# Patient Record
Sex: MALE | Race: BLACK OR AFRICAN AMERICAN | NOT HISPANIC OR LATINO | URBAN - METROPOLITAN AREA
[De-identification: names, ages, dates, MRNs, and addresses within clinical notes are randomized per-mention and may not be internally consistent; named-entity substitution may affect disease eponyms.]

---

## 2018-04-02 ENCOUNTER — INPATIENT (INPATIENT)
Facility: HOSPITAL | Age: 23
LOS: 3 days | Discharge: HOME | End: 2018-04-06
Attending: INTERNAL MEDICINE | Admitting: INTERNAL MEDICINE
Payer: COMMERCIAL

## 2018-04-02 VITALS
OXYGEN SATURATION: 99 % | TEMPERATURE: 100 F | SYSTOLIC BLOOD PRESSURE: 123 MMHG | HEART RATE: 126 BPM | RESPIRATION RATE: 26 BRPM | DIASTOLIC BLOOD PRESSURE: 75 MMHG

## 2018-04-02 DIAGNOSIS — J18.9 PNEUMONIA, UNSPECIFIED ORGANISM: ICD-10-CM

## 2018-04-02 LAB
ALBUMIN SERPL ELPH-MCNC: 3.6 G/DL — SIGNIFICANT CHANGE UP (ref 3.3–5)
ALP SERPL-CCNC: 125 U/L — HIGH (ref 40–120)
ALT FLD-CCNC: 21 U/L — SIGNIFICANT CHANGE UP (ref 12–78)
ANION GAP SERPL CALC-SCNC: 11 MMOL/L — SIGNIFICANT CHANGE UP (ref 5–17)
APPEARANCE UR: CLEAR — SIGNIFICANT CHANGE UP
APTT BLD: 28 SEC — SIGNIFICANT CHANGE UP (ref 27.5–37.4)
AST SERPL-CCNC: 19 U/L — SIGNIFICANT CHANGE UP (ref 15–37)
BACTERIA # UR AUTO: ABNORMAL
BASOPHILS # BLD AUTO: 0 K/UL — SIGNIFICANT CHANGE UP (ref 0–0.2)
BASOPHILS NFR BLD AUTO: 0 % — SIGNIFICANT CHANGE UP (ref 0–2)
BILIRUB SERPL-MCNC: 2.8 MG/DL — HIGH (ref 0.2–1.2)
BILIRUB UR-MCNC: NEGATIVE — SIGNIFICANT CHANGE UP
BUN SERPL-MCNC: 11 MG/DL — SIGNIFICANT CHANGE UP (ref 7–23)
CALCIUM SERPL-MCNC: 9 MG/DL — SIGNIFICANT CHANGE UP (ref 8.5–10.1)
CHLORIDE SERPL-SCNC: 98 MMOL/L — SIGNIFICANT CHANGE UP (ref 96–108)
CK MB BLD-MCNC: <0.4 % — SIGNIFICANT CHANGE UP (ref 0–3.5)
CK MB CFR SERPL CALC: <0.5 NG/ML — SIGNIFICANT CHANGE UP (ref 0.5–3.6)
CK SERPL-CCNC: 116 U/L — SIGNIFICANT CHANGE UP (ref 26–308)
CO2 SERPL-SCNC: 25 MMOL/L — SIGNIFICANT CHANGE UP (ref 22–31)
COLOR SPEC: YELLOW — SIGNIFICANT CHANGE UP
CREAT SERPL-MCNC: 1.23 MG/DL — SIGNIFICANT CHANGE UP (ref 0.5–1.3)
D DIMER BLD IA.RAPID-MCNC: 376 NG/ML DDU — HIGH
DIFF PNL FLD: NEGATIVE — SIGNIFICANT CHANGE UP
EOSINOPHIL # BLD AUTO: 0 K/UL — SIGNIFICANT CHANGE UP (ref 0–0.5)
EOSINOPHIL NFR BLD AUTO: 0 % — SIGNIFICANT CHANGE UP (ref 0–6)
EPI CELLS # UR: SIGNIFICANT CHANGE UP
FLUAV SPEC QL CULT: NEGATIVE — SIGNIFICANT CHANGE UP
FLUBV AG SPEC QL IA: NEGATIVE — SIGNIFICANT CHANGE UP
GLUCOSE SERPL-MCNC: 107 MG/DL — HIGH (ref 70–99)
GLUCOSE UR QL: NEGATIVE MG/DL — SIGNIFICANT CHANGE UP
GRAN CASTS # UR COMP ASSIST: ABNORMAL /LPF
HCT VFR BLD CALC: 41.6 % — SIGNIFICANT CHANGE UP (ref 39–50)
HGB BLD-MCNC: 14.1 G/DL — SIGNIFICANT CHANGE UP (ref 13–17)
INR BLD: 1.33 RATIO — HIGH (ref 0.88–1.16)
KETONES UR-MCNC: NEGATIVE — SIGNIFICANT CHANGE UP
LACTATE SERPL-SCNC: 1.1 MMOL/L — SIGNIFICANT CHANGE UP (ref 0.7–2)
LEUKOCYTE ESTERASE UR-ACNC: NEGATIVE — SIGNIFICANT CHANGE UP
LYMPHOCYTES # BLD AUTO: 0.78 K/UL — LOW (ref 1–3.3)
LYMPHOCYTES # BLD AUTO: 3 % — LOW (ref 13–44)
MANUAL SMEAR VERIFICATION: SIGNIFICANT CHANGE UP
MCHC RBC-ENTMCNC: 28.7 PG — SIGNIFICANT CHANGE UP (ref 27–34)
MCHC RBC-ENTMCNC: 33.9 GM/DL — SIGNIFICANT CHANGE UP (ref 32–36)
MCV RBC AUTO: 84.6 FL — SIGNIFICANT CHANGE UP (ref 80–100)
MONOCYTES # BLD AUTO: 1.82 K/UL — HIGH (ref 0–0.9)
MONOCYTES NFR BLD AUTO: 7 % — SIGNIFICANT CHANGE UP (ref 2–14)
NEUTROPHILS # BLD AUTO: 23.42 K/UL — HIGH (ref 1.8–7.4)
NEUTROPHILS NFR BLD AUTO: 86 % — HIGH (ref 43–77)
NEUTS BAND # BLD: 4 % — SIGNIFICANT CHANGE UP (ref 0–8)
NITRITE UR-MCNC: NEGATIVE — SIGNIFICANT CHANGE UP
NRBC # BLD: 0 /100 — SIGNIFICANT CHANGE UP (ref 0–0)
NRBC # BLD: SIGNIFICANT CHANGE UP /100 WBCS (ref 0–0)
PH UR: 6.5 — SIGNIFICANT CHANGE UP (ref 5–8)
PLAT MORPH BLD: NORMAL — SIGNIFICANT CHANGE UP
PLATELET # BLD AUTO: 199 K/UL — SIGNIFICANT CHANGE UP (ref 150–400)
POTASSIUM SERPL-MCNC: 3.6 MMOL/L — SIGNIFICANT CHANGE UP (ref 3.5–5.3)
POTASSIUM SERPL-SCNC: 3.6 MMOL/L — SIGNIFICANT CHANGE UP (ref 3.5–5.3)
PROT SERPL-MCNC: 8.2 GM/DL — SIGNIFICANT CHANGE UP (ref 6–8.3)
PROT UR-MCNC: 30 MG/DL
PROTHROM AB SERPL-ACNC: 14.6 SEC — HIGH (ref 9.8–12.7)
RBC # BLD: 4.92 M/UL — SIGNIFICANT CHANGE UP (ref 4.2–5.8)
RBC # FLD: 11.4 % — SIGNIFICANT CHANGE UP (ref 10.3–14.5)
RBC BLD AUTO: NORMAL — SIGNIFICANT CHANGE UP
SODIUM SERPL-SCNC: 134 MMOL/L — LOW (ref 135–145)
SP GR SPEC: 1 — LOW (ref 1.01–1.02)
TROPONIN I SERPL-MCNC: <.015 NG/ML — SIGNIFICANT CHANGE UP (ref 0.01–0.04)
UROBILINOGEN FLD QL: NEGATIVE MG/DL — SIGNIFICANT CHANGE UP
WBC # BLD: 26.02 K/UL — HIGH (ref 3.8–10.5)
WBC # FLD AUTO: 26.02 K/UL — HIGH (ref 3.8–10.5)
WBC UR QL: SIGNIFICANT CHANGE UP

## 2018-04-02 PROCEDURE — 99285 EMERGENCY DEPT VISIT HI MDM: CPT

## 2018-04-02 PROCEDURE — 71275 CT ANGIOGRAPHY CHEST: CPT | Mod: 26

## 2018-04-02 PROCEDURE — 93010 ELECTROCARDIOGRAM REPORT: CPT

## 2018-04-02 PROCEDURE — 71045 X-RAY EXAM CHEST 1 VIEW: CPT | Mod: 26

## 2018-04-02 RX ORDER — VANCOMYCIN HCL 1 G
1000 VIAL (EA) INTRAVENOUS ONCE
Qty: 0 | Refills: 0 | Status: COMPLETED | OUTPATIENT
Start: 2018-04-02 | End: 2018-04-02

## 2018-04-02 RX ORDER — SODIUM CHLORIDE 9 MG/ML
2000 INJECTION INTRAMUSCULAR; INTRAVENOUS; SUBCUTANEOUS ONCE
Qty: 0 | Refills: 0 | Status: COMPLETED | OUTPATIENT
Start: 2018-04-02 | End: 2018-04-02

## 2018-04-02 RX ORDER — KETOROLAC TROMETHAMINE 30 MG/ML
30 SYRINGE (ML) INJECTION ONCE
Qty: 0 | Refills: 0 | Status: DISCONTINUED | OUTPATIENT
Start: 2018-04-02 | End: 2018-04-02

## 2018-04-02 RX ORDER — ACETAMINOPHEN 500 MG
650 TABLET ORAL EVERY 6 HOURS
Qty: 0 | Refills: 0 | Status: DISCONTINUED | OUTPATIENT
Start: 2018-04-02 | End: 2018-04-06

## 2018-04-02 RX ORDER — ACETAMINOPHEN 500 MG
650 TABLET ORAL ONCE
Qty: 0 | Refills: 0 | Status: DISCONTINUED | OUTPATIENT
Start: 2018-04-02 | End: 2018-04-02

## 2018-04-02 RX ORDER — AZITHROMYCIN 500 MG/1
500 TABLET, FILM COATED ORAL ONCE
Qty: 0 | Refills: 0 | Status: DISCONTINUED | OUTPATIENT
Start: 2018-04-02 | End: 2018-04-02

## 2018-04-02 RX ORDER — ALBUTEROL 90 UG/1
0 AEROSOL, METERED ORAL
Qty: 0 | Refills: 0 | COMMUNITY

## 2018-04-02 RX ADMIN — Medication 250 MILLIGRAM(S): at 16:13

## 2018-04-02 RX ADMIN — Medication 30 MILLIGRAM(S): at 13:08

## 2018-04-02 RX ADMIN — Medication 30 MILLIGRAM(S): at 13:40

## 2018-04-02 RX ADMIN — Medication 75 MILLIGRAM(S): at 16:12

## 2018-04-02 RX ADMIN — SODIUM CHLORIDE 2000 MILLILITER(S): 9 INJECTION INTRAMUSCULAR; INTRAVENOUS; SUBCUTANEOUS at 13:08

## 2018-04-02 NOTE — ED ADULT NURSE NOTE - OBJECTIVE STATEMENT
assumed care of pt in room A. pt alert and oriented x3. pt c/o of chest pain 6/10,  productive cough with yellow flem, fever, wheezing. Pmh asthma and pt states he ran out of his nebulizer. pt denies radiation of pain. Hr at triage 126, pt on cardiac monitor HR now 86, spo2 96%, fever 102.

## 2018-04-02 NOTE — H&P ADULT - NSHPLABSRESULTS_GEN_ALL_CORE
14.1   26.02 )-----------( 199      ( 02 Apr 2018 13:18 )             41.6   04-02    134<L>  |  98  |  11  ----------------------------<  107<H>  3.6   |  25  |  1.23    Ca    9.0      02 Apr 2018 13:19    TPro  8.2  /  Alb  3.6  /  TBili  2.8<H>  /  DBili  x   /  AST  19  /  ALT  21  /  AlkPhos  125<H>  04-02  < from: CT Angio Chest w/ IV Cont (04.02.18 @ 14:43) >    Multifocal pneumonia.    < from: Xray Chest 1 View-PORTABLE IMMEDIATE (04.02.18 @ 13:05) >    Left lower lobe infiltrate.    < end of copied text >

## 2018-04-02 NOTE — H&P ADULT - HISTORY OF PRESENT ILLNESS
21 yo male c/o chest pain, cough, yellow sputum production for one week. Patient denies headache, rash, sore throat, nasuea, vomiting, abdominal pain. weakness, diarrhea. 21 yo male c/o chest pain, cough, yellow sputum production for one week. Patient denies headache, rash, sore throat, nasuea, vomiting, abdominal pain. weakness, diarrhea.- chest pain is pleuritic with mild short of breath - denies hemoptysis foriegn travel

## 2018-04-02 NOTE — H&P ADULT - ASSESSMENT
22m with multifocal pneumonia     IMPROVE VTE Individual Risk Assessment          RISK                                                          Points  [  ] Previous VTE                                                3  [  ] Thrombophilia                                             2  [  ] Lower limb paralysis                                   2        (unable to hold up >15 seconds)    [  ] Current Cancer                                             2         (within 6 months)  [  ] Immobilization > 24 hrs                              1  [  ] ICU/CCU stay > 24 hours                             1  [  ] Age > 60                                                         1    IMPROVE VTE Score: 0

## 2018-04-02 NOTE — ED ADULT TRIAGE NOTE - CHIEF COMPLAINT QUOTE
pt complaining of cough, chest pain and wheezing times 3 days. seen at urgent care sent in for elevated HR.  at triage.

## 2018-04-02 NOTE — ED PROVIDER NOTE - OBJECTIVE STATEMENT
21 yo male c/o chest pain, cough, yellow sputum production for one week. Patient denies headache, rash, sore throat, nasuea, vomiting, abdominal pain. weakness, diarrhea.

## 2018-04-02 NOTE — ED PROVIDER NOTE - MEDICAL DECISION MAKING DETAILS
Patient with cough, found to have pneumonia; will provide levaquin 750mg daily for 5 days, symptomatic care. return instructions provided Patient with cough, found to have pneumonia; multifocal on ct, will admit

## 2018-04-02 NOTE — H&P ADULT - NSHPPHYSICALEXAM_GEN_ALL_CORE
GENERAL: NAD well-developed  HEAD:  Atraumatic, Normocephalic  EYES: EOMI, PERRLA, conjunctiva and sclera clear  ENMT: No tonsillar erythema, exudates, or enlargement; Moist mucous membranes, Good dentition, No lesions  NECK: Supple, No JVD, Normal thyroid  NERVOUS SYSTEM:  Alert & Oriented X3, Good concentration; Motor Strength 5/5 B/L upper and lower extremities; DTRs 2+ intact and symmetric  CHEST/LUNG: Clear to percussion bilaterally; No rales, rhonchi, wheezing, or rubs  HEART: Regular rate and rhythm; No murmurs, rubs, or gallops  ABDOMEN: Soft, Nontender, Nondistended; Bowel sounds present  EXTREMITIES:  2+ Peripheral Pulses, No clubbing, cyanosis, or edema  LYMPH: No lymphadenopathy   SKIN: No rashes or lesions GENERAL: NAD well-developed  HEAD:  Atraumatic, Normocephalic  EYES: EOMI, PERRLA, conjunctiva and sclera clear  ENMT: No tonsillar erythema, exudates, or enlargement; Moist mucous membranes, Good dentition, No lesions  NECK: Supple, No JVD, Normal thyroid  NERVOUS SYSTEM:  Alert & Oriented X3, Good concentration; Motor Strength 5/5 B/L upper and lower extremities; DTRs 2+ intact and symmetric  CHEST/LUNG: scattered end expiratory squeeks   HEART: Regular rate and rhythm; No murmurs, rubs, or gallops  ABDOMEN: Soft, Nontender, Nondistended; Bowel sounds present  EXTREMITIES:  2+ Peripheral Pulses, No clubbing, cyanosis, or edema  LYMPH: No lymphadenopathy   SKIN: No rashes or lesions

## 2018-04-02 NOTE — H&P ADULT - NSHPREVIEWOFSYSTEMS_GEN_ALL_CORE

## 2018-04-03 DIAGNOSIS — A40.3 SEPSIS DUE TO STREPTOCOCCUS PNEUMONIAE: ICD-10-CM

## 2018-04-03 DIAGNOSIS — J15.6 PNEUMONIA DUE TO OTHER GRAM-NEGATIVE BACTERIA: ICD-10-CM

## 2018-04-03 LAB
CULTURE RESULTS: NO GROWTH — SIGNIFICANT CHANGE UP
GRAM STN FLD: SIGNIFICANT CHANGE UP
GRAM STN FLD: SIGNIFICANT CHANGE UP
METHOD TYPE: SIGNIFICANT CHANGE UP
S PNEUM DNA BLD POS QL NAA+NON-PROBE: SIGNIFICANT CHANGE UP
SPECIMEN SOURCE: SIGNIFICANT CHANGE UP

## 2018-04-03 PROCEDURE — 99223 1ST HOSP IP/OBS HIGH 75: CPT

## 2018-04-03 PROCEDURE — 99233 SBSQ HOSP IP/OBS HIGH 50: CPT

## 2018-04-03 RX ORDER — CEFTRIAXONE 500 MG/1
INJECTION, POWDER, FOR SOLUTION INTRAMUSCULAR; INTRAVENOUS
Qty: 0 | Refills: 0 | Status: DISCONTINUED | OUTPATIENT
Start: 2018-04-03 | End: 2018-04-06

## 2018-04-03 RX ORDER — CEFTRIAXONE 500 MG/1
2 INJECTION, POWDER, FOR SOLUTION INTRAMUSCULAR; INTRAVENOUS EVERY 24 HOURS
Qty: 0 | Refills: 0 | Status: DISCONTINUED | OUTPATIENT
Start: 2018-04-04 | End: 2018-04-06

## 2018-04-03 RX ORDER — CEFTRIAXONE 500 MG/1
2 INJECTION, POWDER, FOR SOLUTION INTRAMUSCULAR; INTRAVENOUS ONCE
Qty: 0 | Refills: 0 | Status: COMPLETED | OUTPATIENT
Start: 2018-04-03 | End: 2018-04-03

## 2018-04-03 RX ADMIN — CEFTRIAXONE 100 GRAM(S): 500 INJECTION, POWDER, FOR SOLUTION INTRAMUSCULAR; INTRAVENOUS at 22:17

## 2018-04-03 NOTE — PROVIDER CONTACT NOTE (CRITICAL VALUE NOTIFICATION) - TEST AND RESULT REPORTED:
blood culture results from 4/2/18 growth in aerobic and anaerobic bottles gram (+) cocci in pairs and chains

## 2018-04-03 NOTE — CONSULT NOTE ADULT - ASSESSMENT
24 yr old with pneumonia due to strept pneumo with BSI   cahge to [facility]   check HIV (pt consented )   repeat blood c/s in am 24 yr old with pneumonia due to strept pneumo with BSI with sepsis at presentation    change levaquin  to Rocephin   check HIV (pt consented )   repeat blood c/s in am

## 2018-04-03 NOTE — PROGRESS NOTE ADULT - SUBJECTIVE AND OBJECTIVE BOX
Patient is a 22y old  Male who presents with a chief complaint of     OVERNIGHT EVENTS:      REVIEW OF SYSTEMS: denies chest pain/SOB, diaphoresis, no F/C, cough, dizziness, headache, blurry vision, nausea, vomiting, abdominal pain. Rest unremarkable     MEDICATIONS  (STANDING):  levoFLOXacin IVPB      levoFLOXacin IVPB 750 milliGRAM(s) IV Intermittent every 24 hours    MEDICATIONS  (PRN):  acetaminophen   Tablet 650 milliGRAM(s) Oral every 6 hours PRN For Temp greater than 38 C (100.4 F)      Allergies    No Known Drug Allergies  Nuts (Swelling)  peanuts (Swelling)    Intolerances        SUBJECTIVE: in bed in NAD, no acute events overnight     T(F): 99 (18 @ 10:32), Max: 99.6 (18 @ 00:16)  HR: 79 (18 @ 10:32) (79 - 92)  BP: 113/73 (18 @ 10:32) (113/73 - 131/108)  RR: 16 (18 @ 10:32) (16 - 19)  SpO2: 97% (18 @ 10:32) (94% - 98%)  Wt(kg): --    PHYSICAL EXAM:  GENERAL: NAD, well-groomed, well-developed  HEAD:  Atraumatic, Normocephalic  EYES: EOMI, PERRLA, conjunctiva and sclera clear  ENMT: No tonsillar erythema, exudates, or enlargement; Moist mucous membranes, Good dentition, No lesions  NECK: Supple, No JVD, Normal thyroid  CHEST/LUNG: Clear to  auscultation bilaterally; No rales, rhonchi, wheezing, or rubs  bilaterally  HEART: Regular rate and rhythm; No murmurs, rubs, or gallops  ABDOMEN: Soft, Nontender, Nondistended; Bowel sounds present  EXTREMITIES:  2+ Peripheral Pulses, No clubbing, cyanosis, or edema BL LE  LYMPH: No lymphadenopathy noted  SKIN: No rashes or lesions  NERVOUS SYSTEM:  Alert & Oriented X3, Good concentration; Motor Strength 5/5 B/L upper and lower extremities;   DTRs 2+ intact and symmetric, sensation intact BL    LABS:                        14.1   26.02 )-----------( 199      ( 2018 13:18 )             41.6     04-02    134<L>  |  98  |  11  ----------------------------<  107<H>  3.6   |  25  |  1.23    Ca    9.0      2018 13:19    TPro  8.2  /  Alb  3.6  /  TBili  2.8<H>  /  DBili  x   /  AST  19  /  ALT  21  /  AlkPhos  125<H>      PT/INR - ( 2018 13:19 )   PT: 14.6 sec;   INR: 1.33 ratio         PTT - ( 2018 13:19 )  PTT:28.0 sec  Urinalysis Basic - ( 2018 16:29 )    Color: Yellow / Appearance: Clear / S.005 / pH: x  Gluc: x / Ketone: Negative  / Bili: Negative / Urobili: Negative mg/dL   Blood: x / Protein: 30 mg/dL / Nitrite: Negative   Leuk Esterase: Negative / RBC: x / WBC 0-2   Sq Epi: x / Non Sq Epi: Occasional / Bacteria: Few      Cultures;   CAPILLARY BLOOD GLUCOSE        Lipid panel:     CARDIAC MARKERS ( 2018 13:19 )  <.015 ng/mL / x     / 116 U/L / x     / <0.5 ng/mL        RADIOLOGY & ADDITIONAL TESTS:      Imaging Personally Reviewed:  [ x] YES      Consultant(s) Notes Reviewed:  [x ] YES     Care Discussed with [x ] Consultants [X ] Patient [x ] Family  [x ]    [x ]  Other; RN Patient is a 22y old  Male who presents with a chief complaint of     OVERNIGHT EVENTS: none  states feels better      REVIEW OF SYSTEMS: denies chest pain/SOB, diaphoresis, no F/C, cough, dizziness, headache, blurry vision, nausea, vomiting, abdominal pain. Rest unremarkable     MEDICATIONS  (STANDING):  levoFLOXacin IVPB      levoFLOXacin IVPB 750 milliGRAM(s) IV Intermittent every 24 hours    MEDICATIONS  (PRN):  acetaminophen   Tablet 650 milliGRAM(s) Oral every 6 hours PRN For Temp greater than 38 C (100.4 F)      Allergies    No Known Drug Allergies  Nuts (Swelling)  peanuts (Swelling)    Intolerances        SUBJECTIVE: in bed in NAD, no acute events overnight     T(F): 99 (18 @ 10:32), Max: 99.6 (18 @ 00:16)  HR: 79 (18 @ 10:32) (79 - 92)  BP: 113/73 (18 @ 10:32) (113/73 - 131/108)  RR: 16 (18 @ 10:32) (16 - 19)  SpO2: 97% (18 @ 10:32) (94% - 98%)  Wt(kg): --    PHYSICAL EXAM:  GENERAL: NAD, well-groomed, well-developed  HEAD:  Atraumatic, Normocephalic  EYES: EOMI, PERRLA, conjunctiva and sclera clear  ENMT: No tonsillar erythema, exudates, or enlargement; Moist mucous membranes, Good dentition, No lesions  NECK: Supple, No JVD, Normal thyroid  CHEST/LUNG:  mild coarse bs scattered. Clear to  auscultation bilaterally; No rales, rhonchi, wheezing, or rubs  bilaterally  HEART: Regular rate and rhythm; No murmurs, rubs, or gallops  ABDOMEN: Soft, Nontender, Nondistended; Bowel sounds present  EXTREMITIES:  2+ Peripheral Pulses, No clubbing, cyanosis, or edema BL LE    SKIN: No rashes or lesions, covered in tattoos  NERVOUS SYSTEM:  Alert & Oriented X3, Good concentration; Motor Strength 5/5 B/L upper and lower extremities;   DTRs 2+ intact and symmetric, sensation intact BL    LABS:                        14.1   26.02 )-----------( 199      ( 2018 13:18 )             41.6         134<L>  |  98  |  11  ----------------------------<  107<H>  3.6   |  25  |  1.23    Ca    9.0      2018 13:19    TPro  8.2  /  Alb  3.6  /  TBili  2.8<H>  /  DBili  x   /  AST  19  /  ALT  21  /  AlkPhos  125<H>      PT/INR - ( 2018 13:19 )   PT: 14.6 sec;   INR: 1.33 ratio         PTT - ( 2018 13:19 )  PTT:28.0 sec  Urinalysis Basic - ( 2018 16:29 )    Color: Yellow / Appearance: Clear / S.005 / pH: x  Gluc: x / Ketone: Negative  / Bili: Negative / Urobili: Negative mg/dL   Blood: x / Protein: 30 mg/dL / Nitrite: Negative   Leuk Esterase: Negative / RBC: x / WBC 0-2   Sq Epi: x / Non Sq Epi: Occasional / Bacteria: Few      Cultures;   CAPILLARY BLOOD GLUCOSE        Lipid panel:     CARDIAC MARKERS ( 2018 13:19 )  <.015 ng/mL / x     / 116 U/L / x     / <0.5 ng/mL        RADIOLOGY & ADDITIONAL TESTS:      Imaging Personally Reviewed:  [ x] YES      Consultant(s) Notes Reviewed:  [x ] YES     Care Discussed with [x ] Consultants [X ] Patient [x ] Family  [x ]    [x ]  Other; RN

## 2018-04-03 NOTE — CONSULT NOTE ADULT - SUBJECTIVE AND OBJECTIVE BOX
Infectious Diseases - Attending at Dr. [facility]    HPI:  23 yo male c/o chest pain, cough, yellow sputum production for one week. Patient denies headache, rash, sore throat, nasuea, vomiting, abdominal pain. weakness, diarrhea.- chest pain is pleuritic with mild short of breath - denies hemoptysis foriegn travel (2018 16:04)      PAST MEDICAL & SURGICAL HISTORY:  Asthma  No pertinent past medical history  No significant past surgical history      Allergies    No Known Drug Allergies  Nuts (Swelling)  peanuts (Swelling)    Intolerances        FAMILY HISTORY:      SOCIAL HISTORY:    REVIEW OF SYSTEMS:    Constitutional: No fever, weight loss or fatigue  Eyes: No eye pain, visual disturbances, or discharge  ENT:  No difficulty hearing, tinnitus, vertigo; No sinus or throat pain  Neck: No pain or stiffness  Respiratory: No cough, wheezing, chills or hemoptysis  Cardiovascular: No chest pain, palpitations, shortness of breath, dizziness or leg swelling  Gastrointestinal: No abdominal or epigastric pain. No nausea, vomiting or hematemesis; No diarrhea or constipation. No melena or hematochezia.  Genitourinary: No dysuria, frequency, hematuria or incontinence  Neurological: No headaches, memory loss, loss of strength, numbness or tremors  Skin: No itching, burning, rashes or lesions       MEDICATIONS  (STANDING):  cefTRIAXone   IVPB        MEDICATIONS  (PRN):  acetaminophen   Tablet 650 milliGRAM(s) Oral every 6 hours PRN For Temp greater than 38 C (100.4 F)  guaiFENesin    Syrup 200 milliGRAM(s) Oral every 6 hours PRN Cough      Vital Signs Last 24 Hrs  T(C): 36.6 (2018 17:49), Max: 37.6 (2018 00:16)  T(F): 97.9 (2018 17:49), Max: 99.6 (2018 00:16)  HR: 66 (2018 17:49) (66 - 85)  BP: 124/70 (2018 17:49) (113/73 - 124/77)  BP(mean): --  RR: 15 (2018 17:49) (15 - 16)  SpO2: 96% (2018 17:49) (94% - 97%)    PHYSICAL EXAM:    Constitutional: NAD, well-groomed, well-developed  HEENT: PERRLA, EOMI, Normal Hearing, MMM  Neck: No LAD, No JVD  Back: Normal spine flexure, No CVA tenderness  Respiratory: CTAB/L  Cardiovascular: S1 and S2, RRR, no M/G/R  Gastrointestinal: BS+, soft, NT/ND  Extremities: No peripheral edema  Vascular: 2+ peripheral pulses  Neurological: A/O x 3, no focal deficits  Skin: No rashes      LABS:                        14.1   26.02 )-----------( 199      ( 2018 13:18 )             41.6         134<L>  |  98  |  11  ----------------------------<  107<H>  3.6   |  25  |  1.23    Ca    9.0      2018 13:19    TPro  8.2  /  Alb  3.6  /  TBili  2.8<H>  /  DBili  x   /  AST  19  /  ALT  21  /  AlkPhos  125<H>      PT/INR - ( 2018 13:19 )   PT: 14.6 sec;   INR: 1.33 ratio         PTT - ( 2018 13:19 )  PTT:28.0 sec  Urinalysis Basic - ( 2018 16:29 )    Color: Yellow / Appearance: Clear / S.005 / pH: x  Gluc: x / Ketone: Negative  / Bili: Negative / Urobili: Negative mg/dL   Blood: x / Protein: 30 mg/dL / Nitrite: Negative   Leuk Esterase: Negative / RBC: x / WBC 0-2   Sq Epi: x / Non Sq Epi: Occasional / Bacteria: Few        MICROBIOLOGY:  RECENT CULTURES:   .Urine Clean Catch (Midstream) XXXX XXXX   No growth     .Blood Blood-Venous XXXX   Growth in aerobic bottle: Gram Positive Cocci in Pairs and Chains  Growth in anaerobic bottle: Gram positive cocci in pairs   Growth in aerobic bottle: Gram Positive Cocci in Pairs and Chains  Growth in anaerobic bottle: Gram positive cocci in pairs     .Blood Blood-Venous Blood Culture PCR   Growth in aerobic and anaerobic bottles: Gram Positive Cocci in Pairs and  Chains   Growth in aerobic and anaerobic bottles: Gram Positive Cocci in Pairs and  Chains  "Due to technical problems, Proteus sp. will Not be reported as part of  the BCID panel until further notice"  ***Blood Panel PCR results on this specimen are available  approximately 3 hours after the Gram stain result.***  Gram stain, PCR, and/or culture results may not always  correspond due to difference in methodologies.  ************************************************************  This PCR assay was performed using [employer].  The following targets are tested for: Enterococcus,  vancomycin resistant enterococci, Listeria monocytogenes,  coagulase negative staphylococci, S. aureus,  methicillin resistant S. aureus, Streptococcus agalactiae  (Group B), S. pneumoniae, S. pyogenes (Group A),  Acinetobacter baumannii, Enterobacter cloacae, E. coli,  Klebsiella oxytoca, K. pneumoniae, Proteus sp.,  Serratia marcescens, Haemophilus influenzae,  Neisseria meningitidis, Pseudomonas aeruginosa, Candida  albicans, C. glabrata, C krusei, C parapsilosis,  C. tropicalis and the KPC resistance gene.          RADIOLOGY & ADDITIONAL STUDIES: Infectious Diseases - Attending at Dr[facility]    HPI:  23 yo male c/o chest pain, cough, yellow sputum production for one week. Patient denies headache, rash, sore throat, nasuea, vomiting, abdominal pain. weakness, diarrhea.- chest pain is pleuritic with mild short of breath - denies hemoptysis foriegn travel (2018 16:04)      PAST MEDICAL & SURGICAL HISTORY:  Asthma  No pertinent past medical history  No significant past surgical history      Allergies    No Known Drug Allergies  Nuts (Swelling)  peanuts (Swelling)    Intolerances        FAMILY HISTORY:non contributory       SOCIAL HISTORY:non smoker    REVIEW OF SYSTEMS:    Constitutional: +chills or fatigue  Eyes: No eye pain, visual disturbances, or discharge  ENT:  No difficulty hearing, tinnitus, vertigo; No sinus or throat pain  Neck: No pain or stiffness  Respiratory: + cough with yellow phlegm, wheezing, No hemoptysis  Cardiovascular: + pleuritic chest pain, no palpitations, shortness of breath, dizziness or leg swelling  Gastrointestinal: No abdominal or epigastric pain. No nausea, vomiting or hematemesis; No diarrhea or constipation. No melena or hematochezia.  Genitourinary: No dysuria, frequency, hematuria or incontinence  Neurological: No headaches, memory loss, loss of strength, numbness or tremors  Skin: No itching, burning, rashes or lesions       MEDICATIONS  (STANDING):  cefTRIAXone   IVPB        MEDICATIONS  (PRN):  acetaminophen   Tablet 650 milliGRAM(s) Oral every 6 hours PRN For Temp greater than 38 C (100.4 F)  guaiFENesin    Syrup 200 milliGRAM(s) Oral every 6 hours PRN Cough      Vital Signs Last 24 Hrs  T(C): 36.6 (2018 17:49), Max: 37.6 (2018 00:16)  T(F): 97.9 (2018 17:49), Max: 99.6 (2018 00:16)  HR: 66 (2018 17:49) (66 - 85)  BP: 124/70 (2018 17:49) (113/73 - 124/77)  BP(mean): --  RR: 15 (2018 17:49) (15 - 16)  SpO2: 96% (2018 17:49) (94% - 97%)    PHYSICAL EXAM:    Constitutional: NAD, well-groomed, well-developed  HEENT: PERRLA, EOMI, Normal Hearing, MMM  Neck: No LAD, No JVD  Back: Normal spine flexure, No CVA tenderness  Respiratory: CTAB/L  Cardiovascular: S1 and S2, RRR, no M/G/R  Gastrointestinal: BS+, soft, NT/ND  Extremities: No peripheral edema  Vascular: 2+ peripheral pulses  Neurological: A/O x 3, no focal deficits  Skin: No rashes      LABS:                        14.1   26.02 )-----------( 199      ( 2018 13:18 )             41.6         134<L>  |  98  |  11  ----------------------------<  107<H>  3.6   |  25  |  1.23    Ca    9.0      2018 13:19    TPro  8.2  /  Alb  3.6  /  TBili  2.8<H>  /  DBili  x   /  AST  19  /  ALT  21  /  AlkPhos  125<H>      PT/INR - ( 2018 13:19 )   PT: 14.6 sec;   INR: 1.33 ratio         PTT - ( 2018 13:19 )  PTT:28.0 sec  Urinalysis Basic - ( 2018 16:29 )    Color: Yellow / Appearance: Clear / S.005 / pH: x  Gluc: x / Ketone: Negative  / Bili: Negative / Urobili: Negative mg/dL   Blood: x / Protein: 30 mg/dL / Nitrite: Negative   Leuk Esterase: Negative / RBC: x / WBC 0-2   Sq Epi: x / Non Sq Epi: Occasional / Bacteria: Few        MICROBIOLOGY:  RECENT CULTURES:   .Urine Clean Catch (Midstream) XXXX XXXX   No growth     .Blood Blood-Venous XXXX   Growth in aerobic bottle: Gram Positive Cocci in Pairs and Chains  Growth in anaerobic bottle: Gram positive cocci in pairs   Growth in aerobic bottle: Gram Positive Cocci in Pairs and Chains  Growth in anaerobic bottle: Gram positive cocci in pairs     .Blood Blood-Venous Blood Culture PCR   Growth in aerobic and anaerobic bottles: Gram Positive Cocci in Pairs and  Chains   Growth in aerobic and anaerobic bottles: Gram Positive Cocci in Pairs and  Chains  "Due to technical problems, Proteus sp. will Not be reported as part of  the BCID panel until further notice"  ***Blood Panel PCR results on this specimen are available  approximately 3 hours after the Gram stain result.***  Gram stain, PCR, and/or culture results may not always  correspond due to difference in methodologies.  ************************************************************  This PCR assay was performed using [employer].  The following targets are tested for: Enterococcus,  vancomycin resistant enterococci, Listeria monocytogenes,  coagulase negative staphylococci, S. aureus,  methicillin resistant S. aureus, Streptococcus agalactiae  (Group B), S. pneumoniae, S. pyogenes (Group A),  Acinetobacter baumannii, Enterobacter cloacae, E. coli,  Klebsiella oxytoca, K. pneumoniae, Proteus sp.,  Serratia marcescens, Haemophilus influenzae,  Neisseria meningitidis, Pseudomonas aeruginosa, Candida  albicans, C. glabrata, C krusei, C parapsilosis,  C. tropicalis and the KPC resistance gene.          RADIOLOGY & ADDITIONAL STUDIES:  < from: CT Angio Chest w/ IV Cont (18 @ 14:43) >    CHEST:     LUNGS AND LARGE AIRWAYS: Patent central airways. Patchy nodular and   groundglass airspace consolidative opacities within the bilateral upper   and lower lobes concerning for multifocal pneumonia.  PLEURA: No pleural effusion.  VESSELS: Adequate opacification of the pulmonary arteries, however   respiratory motion limits evaluation of numerous subsegmental pulmonary   arterial branches. No evidence of pulmonary embolus.  HEART: Heart size is normal. No pericardial effusion.  MEDIASTINUM AND KERWIN: No lymphadenopathy.  CHEST WALL AND LOWER NECK: Within normal limits.  VISUALIZED UPPER ABDOMEN: Partially imaged low-attenuation lesion within   the right hepatic lobe too small to accurately characterize.  BONES: Within normal limits.    IMPRESSION:    No evidence of pulmonary embolus.    Multifocal pneumonia.    < end of copied text >

## 2018-04-04 DIAGNOSIS — J13 PNEUMONIA DUE TO STREPTOCOCCUS PNEUMONIAE: ICD-10-CM

## 2018-04-04 LAB
ANION GAP SERPL CALC-SCNC: 8 MMOL/L — SIGNIFICANT CHANGE UP (ref 5–17)
BUN SERPL-MCNC: 8 MG/DL — SIGNIFICANT CHANGE UP (ref 7–23)
CALCIUM SERPL-MCNC: 8.9 MG/DL — SIGNIFICANT CHANGE UP (ref 8.5–10.1)
CHLORIDE SERPL-SCNC: 105 MMOL/L — SIGNIFICANT CHANGE UP (ref 96–108)
CO2 SERPL-SCNC: 28 MMOL/L — SIGNIFICANT CHANGE UP (ref 22–31)
CREAT SERPL-MCNC: 1.01 MG/DL — SIGNIFICANT CHANGE UP (ref 0.5–1.3)
GLUCOSE SERPL-MCNC: 90 MG/DL — SIGNIFICANT CHANGE UP (ref 70–99)
HCT VFR BLD CALC: 39.5 % — SIGNIFICANT CHANGE UP (ref 39–50)
HGB BLD-MCNC: 13 G/DL — SIGNIFICANT CHANGE UP (ref 13–17)
HIV 1+2 AB+HIV1 P24 AG SERPL QL IA: SIGNIFICANT CHANGE UP
MAGNESIUM SERPL-MCNC: 2.3 MG/DL — SIGNIFICANT CHANGE UP (ref 1.6–2.6)
MCHC RBC-ENTMCNC: 28.4 PG — SIGNIFICANT CHANGE UP (ref 27–34)
MCHC RBC-ENTMCNC: 32.9 GM/DL — SIGNIFICANT CHANGE UP (ref 32–36)
MCV RBC AUTO: 86.4 FL — SIGNIFICANT CHANGE UP (ref 80–100)
NRBC # BLD: 0 /100 WBCS — SIGNIFICANT CHANGE UP (ref 0–0)
PHOSPHATE SERPL-MCNC: 3.8 MG/DL — SIGNIFICANT CHANGE UP (ref 2.5–4.5)
PLATELET # BLD AUTO: 226 K/UL — SIGNIFICANT CHANGE UP (ref 150–400)
POTASSIUM SERPL-MCNC: 3.6 MMOL/L — SIGNIFICANT CHANGE UP (ref 3.5–5.3)
POTASSIUM SERPL-SCNC: 3.6 MMOL/L — SIGNIFICANT CHANGE UP (ref 3.5–5.3)
RBC # BLD: 4.57 M/UL — SIGNIFICANT CHANGE UP (ref 4.2–5.8)
RBC # FLD: 11.6 % — SIGNIFICANT CHANGE UP (ref 10.3–14.5)
SODIUM SERPL-SCNC: 141 MMOL/L — SIGNIFICANT CHANGE UP (ref 135–145)
WBC # BLD: 9.14 K/UL — SIGNIFICANT CHANGE UP (ref 3.8–10.5)
WBC # FLD AUTO: 9.14 K/UL — SIGNIFICANT CHANGE UP (ref 3.8–10.5)

## 2018-04-04 PROCEDURE — 99233 SBSQ HOSP IP/OBS HIGH 50: CPT

## 2018-04-04 PROCEDURE — 99232 SBSQ HOSP IP/OBS MODERATE 35: CPT

## 2018-04-04 RX ADMIN — CEFTRIAXONE 100 GRAM(S): 500 INJECTION, POWDER, FOR SOLUTION INTRAMUSCULAR; INTRAVENOUS at 21:35

## 2018-04-04 RX ADMIN — Medication 200 MILLIGRAM(S): at 13:18

## 2018-04-04 RX ADMIN — Medication 200 MILLIGRAM(S): at 19:21

## 2018-04-04 NOTE — PROGRESS NOTE ADULT - SUBJECTIVE AND OBJECTIVE BOX
Patient is a 22y old  Male who presents with a chief complaint of     OVERNIGHT EVENTS: none  states feels better      REVIEW OF SYSTEMS: denies chest pain/SOB, diaphoresis, no F/C, cough, dizziness, headache, blurry vision, nausea, vomiting, abdominal pain. Rest unremarkable     MEDICATIONS  (STANDING):  cefTRIAXone   IVPB      cefTRIAXone   IVPB 2 Gram(s) IV Intermittent every 24 hours    MEDICATIONS  (PRN):  acetaminophen   Tablet 650 milliGRAM(s) Oral every 6 hours PRN For Temp greater than 38 C (100.4 F)  guaiFENesin    Syrup 200 milliGRAM(s) Oral every 6 hours PRN Cough      Allergies    No Known Drug Allergies  Nuts (Swelling)  peanuts (Swelling)    Intolerances        SUBJECTIVE: in bed in NAD, no acute events overnight     Vital Signs Last 24 Hrs  T(C): 36.6 (04 Apr 2018 05:30), Max: 36.6 (03 Apr 2018 17:49)  T(F): 97.8 (04 Apr 2018 05:30), Max: 97.9 (03 Apr 2018 17:49)  HR: 95 (04 Apr 2018 05:30) (66 - 95)  BP: 110/72 (04 Apr 2018 05:30) (110/72 - 131/74)  BP(mean): --  RR: 16 (04 Apr 2018 05:30) (15 - 16)  SpO2: 97% (04 Apr 2018 05:30) (96% - 98%)    PHYSICAL EXAM:  GENERAL: NAD, well-groomed, well-developed  HEAD:  Atraumatic, Normocephalic  EYES: EOMI, PERRLA, conjunctiva and sclera clear  ENMT: No tonsillar erythema, exudates, or enlargement; Moist mucous membranes, Good dentition, No lesions  NECK: Supple, No JVD, Normal thyroid  CHEST/LUNG:  mild coarse bs scattered. Clear to  auscultation bilaterally; No rales, rhonchi, wheezing, or rubs  bilaterally  HEART: Regular rate and rhythm; No murmurs, rubs, or gallops  ABDOMEN: Soft, Nontender, Nondistended; Bowel sounds present  EXTREMITIES:  2+ Peripheral Pulses, No clubbing, cyanosis, or edema BL LE    SKIN: No rashes or lesions, covered in tattoos  NERVOUS SYSTEM:  Alert & Oriented X3, Good concentration; Motor Strength 5/5 B/L upper and lower extremities;   DTRs 2+ intact and symmetric, sensation intact BL    LABS:                                     13.0   9.14  )-----------( 226      ( 04 Apr 2018 06:20 )             39.5   04-04    141  |  105  |  8   ----------------------------<  90  3.6   |  28  |  1.01    Ca    8.9      04 Apr 2018 06:20  Phos  3.8     04-04  Mg     2.3     04-04      Culture - Urine (collected 02 Apr 2018 22:39)  Source: .Urine Clean Catch (Midstream)  Final Report (03 Apr 2018 19:30):    No growth    Culture - Blood (collected 02 Apr 2018 16:43)  Source: .Blood Blood-Venous  Gram Stain (prelim) (03 Apr 2018 10:03):    Growth in aerobic bottle: Gram Positive Cocci in Pairs and Chains    Growth in anaerobic bottle: Gram positive cocci in pairs  Preliminary Report (04 Apr 2018 09:03):    Growth in aerobic and anaerobic bottles: Streptococcus pneumoniae    Culture - Blood (collected 02 Apr 2018 16:40)  Source: .Blood Blood-Venous  Gram Stain (prelim) (03 Apr 2018 06:56):    Growth in aerobic and anaerobic bottles: Gram Positive Cocci in Pairs and    Chains  Preliminary Report (04 Apr 2018 09:01):    Growth in aerobic and anaerobic bottles: Streptococcus pneumoniae    "Due to technical problems, Proteus sp. will Not be reported as part of    the BCID panel until further notice"    ***Blood Panel PCR results on this specimen are available    approximately 3 hours after the Gram stain result.***    Gram stain, PCR, and/or culture results may not always    correspond due to difference in methodologies.    ************************************************************    This PCR assay was performed using [employer].    The following targets are tested for: Enterococcus,    vancomycin resistant enterococci, Listeria monocytogenes,    coagulase negative staphylococci, S. aureus,    methicillin resistant S. aureus, Streptococcus agalactiae    (Group B), S. pneumoniae, S. pyogenes (Group A),    Acinetobacter baumannii, Enterobacter cloacae, E. coli,    Klebsiella oxytoca, K. pneumoniae, Proteus sp.,    Serratia marcescens, Haemophilus influenzae,    Neisseria meningitidis, Pseudomonas aeruginosa, Candida    albicans, C.glabrata, C krusei, C parapsilosis,    C. tropicalis and the KPC resistance gene.  Organism: Blood Culture PCR (03 Apr 2018 09:05)  Organism: Blood Culture PCR (03 Apr 2018 09:05)    Cultures;   CAPILLARY BLOOD GLUCOSE        Lipid panel:     CARDIAC MARKERS ( 02 Apr 2018 13:19 )  <.015 ng/mL / x     / 116 U/L / x     / <0.5 ng/mL        RADIOLOGY & ADDITIONAL TESTS:      Imaging Personally Reviewed:  [ x] YES      Consultant(s) Notes Reviewed:  [x ] YES     Care Discussed with [x ] Consultants [X ] Patient [x ] Family  [x ]    [x ]  Other; RN

## 2018-04-04 NOTE — PROGRESS NOTE ADULT - PROBLEM SELECTOR PLAN 1
resolving   bactermeia present on admission   follow up on repeat blood c/s   if neg   switch to oral and d/c home to finish total of 2 weeks

## 2018-04-04 NOTE — PROGRESS NOTE ADULT - SUBJECTIVE AND OBJECTIVE BOX
Patient is a 22y old  Male who presents with a chief complaint of cough and chest pain     INTERVAL HPI / OVERNIGHT EVENTS:cough and chest pain resolving    MEDICATIONS  (STANDING):  cefTRIAXone   IVPB      cefTRIAXone   IVPB 2 Gram(s) IV Intermittent every 24 hours    MEDICATIONS  (PRN):  acetaminophen   Tablet 650 milliGRAM(s) Oral every 6 hours PRN For Temp greater than 38 C (100.4 F)  guaiFENesin    Syrup 200 milliGRAM(s) Oral every 6 hours PRN Cough      Vital Signs Last 24 Hrs  T(C): 36.6 (2018 05:30), Max: 36.6 (2018 17:49)  T(F): 97.8 (2018 05:30), Max: 97.9 (2018 17:49)  HR: 95 (2018 05:30) (66 - 95)  BP: 110/72 (2018 05:30) (110/72 - 131/74)  BP(mean): --  RR: 16 (2018 05:30) (15 - 16)  SpO2: 97% (2018 05:30) (96% - 98%)    PHYSICAL EXAM:  General :NAD  Constitutional:  well-groomed, well-developed  Respiratory: CTAB/L  Cardiovascular: S1 and S2, RRR, no M/G/R  Gastrointestinal: BS+, soft, NT/ND  Extremities: No peripheral edema  Vascular: 2+ peripheral pulses  Skin: No rashes      LABS:                        13.0   9.14  )-----------( 226      ( 2018 06:20 )             39.5     04-    141  |  105  |  8   ----------------------------<  90  3.6   |  28  |  1.01    Ca    8.9      2018 06:20  Phos  3.8     04-04  Mg     2.3     04-04    TPro  8.2  /  Alb  3.6  /  TBili  2.8<H>  /  DBili  x   /  AST  19  /  ALT  21  /  AlkPhos  125<H>  04-02    Urinalysis Basic - ( 2018 16:29 )    Color: Yellow / Appearance: Clear / S.005 / pH: x  Gluc: x / Ketone: Negative  / Bili: Negative / Urobili: Negative mg/dL   Blood: x / Protein: 30 mg/dL / Nitrite: Negative   Leuk Esterase: Negative / RBC: x / WBC 0-2   Sq Epi: x / Non Sq Epi: Occasional / Bacteria: Few          MICROBIOLOGY:  RECENT CULTURES:   .Urine Clean Catch (Midstream) XXXX XXXX   No growth     .Blood Blood-Venous XXXX   Growth in aerobic bottle: Gram Positive Cocci in Pairs and Chains  Growth in anaerobic bottle: Gram positive cocci in pairs   Growth in aerobic and anaerobic bottles: Streptococcus pneumoniae     .Blood Blood-Venous Blood Culture PCR   Growth in aerobic and anaerobic bottles: Gram Positive Cocci in Pairs and  Chains   Growth in aerobic and anaerobic bottles: Streptococcus pneumoniae  "Due to technical problems, Proteus sp. will Not be reported as part of  the BCID panel until further notice"  ***Blood Panel PCR results on this specimen are available  approximately 3 hours after the Gram stain result.***  Gram stain, PCR, and/or culture results may not always  correspond due to difference in methodologies.  ************************************************************  This PCR assay was performed using [employer].  The following targets are tested for: Enterococcus,  vancomycin resistant enterococci, Listeria monocytogenes,  coagulase negative staphylococci, S. aureus,  methicillin resistant S. aureus, Streptococcus agalactiae  (Group B), S. pneumoniae, S. pyogenes (Group A),  Acinetobacter baumannii, Enterobacter cloacae, E. coli,  Klebsiella oxytoca, K. pneumoniae, Proteus sp.,  Serratia marcescens, Haemophilus influenzae,  Neisseria meningitidis, Pseudomonas aeruginosa, Candida  albicans, C.glabrata, C krusei, C parapsilosis,  C. tropicalis and the KPC resistance gene.          RADIOLOGY & ADDITIONAL STUDIES:

## 2018-04-04 NOTE — PROGRESS NOTE ADULT - PROBLEM SELECTOR PLAN 1
on antibx   wbc has normalized   will d/w Id about d/c planning on antibx   wbc has normalized   will d/w Id about d/c planning   await negative blood cx

## 2018-04-05 LAB
-  CEFTRIAXONE: 0.02 — SIGNIFICANT CHANGE UP
-  CEFTRIAXONE: SIGNIFICANT CHANGE UP
-  LEVOFLOXACIN: SIGNIFICANT CHANGE UP
-  MEROPENEM: SIGNIFICANT CHANGE UP
-  PENICILLIN: SIGNIFICANT CHANGE UP
-  VANCOMYCIN: SIGNIFICANT CHANGE UP
CULTURE RESULTS: SIGNIFICANT CHANGE UP
CULTURE RESULTS: SIGNIFICANT CHANGE UP
GRAM STN FLD: SIGNIFICANT CHANGE UP
GRAM STN FLD: SIGNIFICANT CHANGE UP
METHOD TYPE: SIGNIFICANT CHANGE UP
METHOD TYPE: SIGNIFICANT CHANGE UP
ORGANISM # SPEC MICROSCOPIC CNT: SIGNIFICANT CHANGE UP
SPECIMEN SOURCE: SIGNIFICANT CHANGE UP
SPECIMEN SOURCE: SIGNIFICANT CHANGE UP

## 2018-04-05 PROCEDURE — 99232 SBSQ HOSP IP/OBS MODERATE 35: CPT

## 2018-04-05 RX ADMIN — CEFTRIAXONE 100 GRAM(S): 500 INJECTION, POWDER, FOR SOLUTION INTRAMUSCULAR; INTRAVENOUS at 22:56

## 2018-04-05 NOTE — PROGRESS NOTE ADULT - PROBLEM SELECTOR PLAN 1
on antibx   wbc has normalized   await negative blood cx final results    will d/w Id about d/c planning  HIV negative sepsis was present on admission   on antibx   wbc has normalized   await negative blood cx final results    will d/w Id about d/c planning  HIV negative

## 2018-04-05 NOTE — PROGRESS NOTE ADULT - SUBJECTIVE AND OBJECTIVE BOX
Patient is a 22y old  Male who presents with a chief complaint of     OVERNIGHT EVENTS: none  states feels better      REVIEW OF SYSTEMS: denies chest pain/SOB, diaphoresis, no F/C, cough, dizziness, headache, blurry vision, nausea, vomiting, abdominal pain. Rest unremarkable     MEDICATIONS  (STANDING):  cefTRIAXone   IVPB      cefTRIAXone   IVPB 2 Gram(s) IV Intermittent every 24 hours    MEDICATIONS  (PRN):  acetaminophen   Tablet 650 milliGRAM(s) Oral every 6 hours PRN For Temp greater than 38 C (100.4 F)  guaiFENesin    Syrup 200 milliGRAM(s) Oral every 6 hours PRN Cough      Allergies    No Known Drug Allergies  Nuts (Swelling)  peanuts (Swelling)    Intolerances        SUBJECTIVE: in bed in NAD, no acute events overnight     Vital Signs Last 24 Hrs  T(C): 36.3 (05 Apr 2018 04:51), Max: 37.2 (04 Apr 2018 23:58)  T(F): 97.3 (05 Apr 2018 04:51), Max: 98.9 (04 Apr 2018 23:58)  HR: 51 (05 Apr 2018 04:51) (51 - 88)  BP: 100/50 (05 Apr 2018 04:51) (100/50 - 128/78)  BP(mean): --  RR: 16 (05 Apr 2018 04:51) (16 - 16)  SpO2: 98% (05 Apr 2018 04:51) (96% - 98%)    PHYSICAL EXAM:  GENERAL: NAD, well-groomed, well-developed  HEAD:  Atraumatic, Normocephalic  EYES: EOMI, PERRLA, conjunctiva and sclera clear  ENMT: No tonsillar erythema, exudates, or enlargement; Moist mucous membranes, Good dentition, No lesions  NECK: Supple, No JVD, Normal thyroid  CHEST/LUNG:  mild coarse bs scattered. Clear to  auscultation bilaterally; No rales, rhonchi, wheezing, or rubs  bilaterally  HEART: Regular rate and rhythm; No murmurs, rubs, or gallops  ABDOMEN: Soft, Nontender, Nondistended; Bowel sounds present  EXTREMITIES:  2+ Peripheral Pulses, No clubbing, cyanosis, or edema BL LE    SKIN: No rashes or lesions, covered in tattoos  NERVOUS SYSTEM:  Alert & Oriented X3, Good concentration; Motor Strength 5/5 B/L upper and lower extremities;   DTRs 2+ intact and symmetric, sensation intact BL    LABS:                                              13.0   9.14  )-----------( 226      ( 04 Apr 2018 06:20 )             39.5     HIV-1/2 Antigen/Antibody Screen by [facility] (04.04.18 @ 08:12)    HIV-1/2 Combo Result: Nonreact: The HIV Ag/Ab Combo test performed screens for HIV-1 p24 antigen,  antibodies to HIV-1 (group M and group O), and antibodies to HIV-2. All  specimens repeatedly reactive will reflex to an HIV 1/2 antibody  confirmation and differentiation test. This assay detects p24 antigen  which may be present prior to the development of HIV antibodies,  therefore a reactive result with a negative HIV 1/2 AB Confirmation  should be followed up with HIV-1 RNA, HIV-2 RNA and repeat testing in 4-8  weeks. A nonreactive result does not preclude previous exposure to or  infection with HIV-1 or HIV-2. [facility] prohibits disclosure of this  result to any unauthorized party.        Cultures;       Culture - Blood (collected 04 Apr 2018 08:37)  Source: .Blood Blood-Peripheral  Preliminary Report (05 Apr 2018 09:01):    No growth to date.    Culture - Blood (collected 04 Apr 2018 08:37)  Source: .Blood Blood-Peripheral  Preliminary Report (05 Apr 2018 09:01):    No growth to date.    Culture - Urine (collected 02 Apr 2018 22:39)  Source: .Urine Clean Catch (Midstream)  Final Report (03 Apr 2018 19:30):    No growth    Culture - Blood (collected 02 Apr 2018 16:43)  Source: .Blood Blood-Venous  Gram Stain (05 Apr 2018 12:38):    Growth in aerobic bottle: Gram Positive Cocci in Pairs and Chains    Growth in anaerobic bottle: Gram positive cocci in pairs  Final Report (05 Apr 2018 12:38):    Growth in aerobic and anaerobic bottles: Streptococcus pneumoniae    See previous culture [id number]    Culture - Blood (collected 02 Apr 2018 16:40)  Source: .Blood Blood-Venous  Gram Stain (05 Apr 2018 10:59):    Growth in aerobic and anaerobic bottles: Gram Positive Cocci in Pairs and    Chains  Final Report (05 Apr 2018 10:59):    Growth in aerobic and anaerobic bottles: Streptococcus pneumoniae    Therapy requires maximum dose of Ceftriaxone and/or    Penicillin. Interpretive criteria as follows:    Ceftriaxone breakpoints for meningitis infections:    <=0.5=Sensitive, 1.0=Intermediate, >=2.0=Resistant    Penicillin breakpoints for meningitis infections:    <=0.06=Sensitive, >= 0.12=Resistant    Ceftriaxone breakpoints for non-meningitis infections:    <=1.0=Sensitive, 2.0=Intermediate, >=4.0=Resistant    Penicillin breakpoints for non-meningitis infections:    <=2.0=Sensitive, 4.0=Intermediate, >=8.0=Resistant    Oral Penicillin breakpoints:    <=0.06=Sensitive, 0.12-1.0=Intermediate, >=2.0=Resistant    Please note: In case of suspected meningitis, CSF    interpretive criteria must be used independent of specimen source.    "Due to technical problems, Proteus sp. will Not be reported as part of    the BCID panel until further notice"    ***Blood Panel PCR results on this specimen are available    approximately 3 hours after the Gram stain result.***    Gram stain, PCR, and/or culture results may not always    correspond due to difference in methodologies.    ************************************************************    This PCR assay was performed using [employer].    The following targets aretested for: Enterococcus,    vancomycin resistant enterococci, Listeria monocytogenes,    coagulase negative staphylococci, S. aureus,    methicillin resistant S. aureus, Streptococcus agalactiae    (Group B), S. pneumoniae, S. pyogenes (Group A),    Acinetobacter baumannii, Enterobacter cloacae, E. coli,    Klebsiella oxytoca, K. pneumoniae, Proteus sp.,    Serratia marcescens, Haemophilus influenzae,    Neisseria meningitidis, Pseudomonas aeruginosa, Candida    albicans, C. glabrata, C krusei, C parapsilosis,    C. tropicalis and the KPC resistance gene.  Organism: Blood Culture PCR  Streptococcus pneumoniae (05 Apr 2018 10:59)  Organism: Streptococcus pneumoniae (05 Apr 2018 10:59)  Organism: Streptococcus pneumoniae (05 Apr 2018 10:59)  Organism: Blood Culture PCR (05 Apr 2018 10:59)      CAPILLARY BLOOD GLUCOSE        Lipid panel:     CARDIAC MARKERS ( 02 Apr 2018 13:19 )  <.015 ng/mL / x     / 116 U/L / x     / <0.5 ng/mL        RADIOLOGY & ADDITIONAL TESTS:      Imaging Personally Reviewed:  [ x] YES      Consultant(s) Notes Reviewed:  [x ] YES     Care Discussed with [x ] Consultants [X ] Patient [x ] Family  [x ]    [x ]  Other; RN

## 2018-04-06 ENCOUNTER — TRANSCRIPTION ENCOUNTER (OUTPATIENT)
Age: 23
End: 2018-04-06

## 2018-04-06 VITALS
DIASTOLIC BLOOD PRESSURE: 71 MMHG | TEMPERATURE: 96 F | HEART RATE: 82 BPM | RESPIRATION RATE: 18 BRPM | SYSTOLIC BLOOD PRESSURE: 121 MMHG | OXYGEN SATURATION: 99 %

## 2018-04-06 LAB
HCT VFR BLD CALC: 39.4 % — SIGNIFICANT CHANGE UP (ref 39–50)
HGB BLD-MCNC: 13 G/DL — SIGNIFICANT CHANGE UP (ref 13–17)
MCHC RBC-ENTMCNC: 28.8 PG — SIGNIFICANT CHANGE UP (ref 27–34)
MCHC RBC-ENTMCNC: 33 GM/DL — SIGNIFICANT CHANGE UP (ref 32–36)
MCV RBC AUTO: 87.4 FL — SIGNIFICANT CHANGE UP (ref 80–100)
NRBC # BLD: 0 /100 WBCS — SIGNIFICANT CHANGE UP (ref 0–0)
PLATELET # BLD AUTO: 308 K/UL — SIGNIFICANT CHANGE UP (ref 150–400)
RBC # BLD: 4.51 M/UL — SIGNIFICANT CHANGE UP (ref 4.2–5.8)
RBC # FLD: 11.5 % — SIGNIFICANT CHANGE UP (ref 10.3–14.5)
WBC # BLD: 10.03 K/UL — SIGNIFICANT CHANGE UP (ref 3.8–10.5)
WBC # FLD AUTO: 10.03 K/UL — SIGNIFICANT CHANGE UP (ref 3.8–10.5)

## 2018-04-06 PROCEDURE — 99239 HOSP IP/OBS DSCHRG MGMT >30: CPT

## 2018-04-06 PROCEDURE — 99233 SBSQ HOSP IP/OBS HIGH 50: CPT

## 2018-04-06 RX ORDER — IPRATROPIUM/ALBUTEROL SULFATE 18-103MCG
3 AEROSOL WITH ADAPTER (GRAM) INHALATION
Qty: 360 | Refills: 0 | OUTPATIENT
Start: 2018-04-06

## 2018-04-06 RX ORDER — LACTOBACILLUS ACIDOPHILUS 100MM CELL
1 CAPSULE ORAL
Qty: 10 | Refills: 0 | OUTPATIENT
Start: 2018-04-06 | End: 2018-04-15

## 2018-04-06 RX ORDER — ALBUTEROL 90 UG/1
1 AEROSOL, METERED ORAL
Qty: 1 | Refills: 0 | OUTPATIENT
Start: 2018-04-06 | End: 2018-05-05

## 2018-04-06 NOTE — DISCHARGE NOTE ADULT - SECONDARY DIAGNOSIS.
Sepsis due to Streptococcus pneumoniae Asthma, unspecified asthma severity, unspecified whether complicated, unspecified whether persistent

## 2018-04-06 NOTE — DISCHARGE NOTE ADULT - HOSPITAL COURSE
· Assessment	  22m with multifocal pneumonia        Problem/Plan - 1:  ·  Problem: Sepsis due to Streptococcus pneumoniae.  Plan: sepsis was present on admission   on antibx   wbc has normalized   await negative blood cx final results   HIV negative.  complete levaquin 750 po for 10 more days    Problem/Plan - 2:  ·  Problem: Gram-negative pneumonia.  Plan: changed to rocephin by ID.     TIME SPENT COMPLETING DISCHARGE AND COORDINATING CARE WITH NURSING,  AND CASE MANAGEMENT APPROX 40MINUTES

## 2018-04-06 NOTE — PROGRESS NOTE ADULT - SUBJECTIVE AND OBJECTIVE BOX
Patient is a 22y old  Male who presents with a chief complaint of pna (06 Apr 2018 11:22)      INTERVAL HPI / OVERNIGHT EVENTS:cough resolving,chest pain resolved        Vital Signs Last 24 Hrs  T(C): 35.7 (06 Apr 2018 11:20), Max: 35.7 (06 Apr 2018 11:20)  T(F): 96.3 (06 Apr 2018 11:20), Max: 96.3 (06 Apr 2018 11:20)  HR: 82 (06 Apr 2018 11:20) (82 - 82)  BP: 121/71 (06 Apr 2018 11:20) (121/71 - 121/71)  BP(mean): --  RR: 18 (06 Apr 2018 11:20) (18 - 18)  SpO2: 99% (06 Apr 2018 11:20) (99% - 99%)    PHYSICAL EXAM:  General :NAD  Constitutional:  well-groomed, well-developed  Respiratory: CTAB/L  Cardiovascular: S1 and S2, RRR, no M/G/R  Gastrointestinal: BS+, soft, NT/ND  Extremities: No peripheral edema  Vascular: 2+ peripheral pulses  Skin: No rashes      LABS:                        13.0   10.03 )-----------( 308      ( 06 Apr 2018 07:30 )             39.4       HIV :non reactive          MICROBIOLOGY:  RECENT CULTURES:  04-04 .Blood Blood-Peripheral XXXX XXXX   No growth to date.    04-02 .Urine Clean Catch (Midstream) XXXX XXXX   No growth    04-02 .Blood Blood-Venous XXXX   Growth in aerobic bottle: Gram Positive Cocci in Pairs and Chains  Growth in anaerobic bottle: Gram positive cocci in pairs   Growth in aerobic and anaerobic bottles: Streptococcus pneumoniae  See previous culture [id number]    04-02 .Blood Blood-Venous Blood Culture PCR  Streptococcus pneumoniae   Growth in aerobic and anaerobic bottles: Gram Positive Cocci in Pairs and  Chains   Growth in aerobic and anaerobic bottles: Streptococcus pneumoniae  Therapy requires maximum dose of Ceftriaxone and/or  Penicillin. Interpretive criteria as follows:  Ceftriaxone breakpoints for meningitis infections:  <=0.5=Sensitive, 1.0=Intermediate, >=2.0=Resistant  Penicillin breakpoints for meningitis infections:  <=0.06=Sensitive, >= 0.12=Resistant  Ceftriaxone breakpoints for non-meningitis infections:  <=1.0=Sensitive, 2.0=Intermediate, >=4.0=Resistant  Penicillin breakpoints for non-meningitis infections:  <=2.0=Sensitive, 4.0=Intermediate, >=8.0=Resistant  Oral Penicillin breakpoints:  <=0.06=Sensitive, 0.12-1.0=Intermediate, >=2.0=Resistant  Please note: In case of suspected meningitis, CSF  interpretive criteria must be used independent of specimen source.  "Due to technical problems, Proteus sp. will Not be reported as part of  the BCID panel until further notice"  ***Blood Panel PCR results on this specimen are available  approximately 3 hours after the Gram stain result.***  Gram stain, PCR, and/or culture results may not always  correspond due to difference in methodologies.  ************************************************************  This PCR assay was performed using [employer].  The following targets aretested for: Enterococcus,  vancomycin resistant enterococci, Listeria monocytogenes,  coagulase negative staphylococci, S. aureus,  methicillin resistant S. aureus, Streptococcus agalactiae  (Group B), S. pneumoniae, S. pyogenes (Group A),  Acinetobacter baumannii, Enterobacter cloacae, E. coli,  Klebsiella oxytoca, K. pneumoniae, Proteus sp.,  Serratia marcescens, Haemophilus influenzae,  Neisseria meningitidis, Pseudomonas aeruginosa, Candida  albicans, C. glabrata, C krusei, C parapsilosis,  C. tropicalis and the KPC resistance gene.          RADIOLOGY & ADDITIONAL STUDIES:

## 2018-04-06 NOTE — CHART NOTE - NSCHARTNOTEFT_GEN_A_CORE
To Whom It May Concern:    Please use this letter  as verfication that the above patient was hospitalized at [facility] from 4/2/18 to 4/6/18 and can return to work on 4/16/18.  If any questions or concerns please call [phone].     Best Regards,  Andressa Henao MD

## 2018-04-06 NOTE — PROGRESS NOTE ADULT - ASSESSMENT
24 yr old with pneumonia due to strept pneumo with BSI with sepsis at presentation    sepsis resolved  repeat blood c/s negative  switch to PO levaquin and d/c to finish total of 14 days of treatment   HIV neg

## 2018-04-06 NOTE — DISCHARGE NOTE ADULT - PATIENT PORTAL LINK FT
You can access the [facility], offered by [facility], by registering with the following website: [email or web address]

## 2018-04-06 NOTE — DISCHARGE NOTE ADULT - MEDICATION SUMMARY - MEDICATIONS TO TAKE
I will START or STAY ON the medications listed below when I get home from the hospital:    albuterol  -- Indication: For respiratory    Acidophilus oral capsule  -- 1 cap(s) by mouth once a day   -- Indication: For Probiotic    Levaquin 750 mg oral tablet  -- 1 tab(s) by mouth once a day   -- Avoid prolonged or excessive exposure to direct and/or artificial sunlight while taking this medication.  Do not take dairy products, antacids, or iron preparations within one hour of this medication.  Finish all this medication unless otherwise directed by prescriber.  May cause drowsiness or dizziness.  Medication should be taken with plenty of water.    -- Indication: For Pneumonia

## 2018-04-09 DIAGNOSIS — J15.6 PNEUMONIA DUE TO OTHER GRAM-NEGATIVE BACTERIA: ICD-10-CM

## 2018-04-09 DIAGNOSIS — A40.3 SEPSIS DUE TO STREPTOCOCCUS PNEUMONIAE: ICD-10-CM

## 2018-04-09 DIAGNOSIS — J45.909 UNSPECIFIED ASTHMA, UNCOMPLICATED: ICD-10-CM

## 2018-04-09 DIAGNOSIS — Z91.010 ALLERGY TO PEANUTS: ICD-10-CM

## 2018-04-09 LAB
CULTURE RESULTS: SIGNIFICANT CHANGE UP
CULTURE RESULTS: SIGNIFICANT CHANGE UP
SPECIMEN SOURCE: SIGNIFICANT CHANGE UP
SPECIMEN SOURCE: SIGNIFICANT CHANGE UP
